# Patient Record
Sex: MALE | Race: ASIAN | NOT HISPANIC OR LATINO | URBAN - METROPOLITAN AREA
[De-identification: names, ages, dates, MRNs, and addresses within clinical notes are randomized per-mention and may not be internally consistent; named-entity substitution may affect disease eponyms.]

---

## 2022-12-07 ENCOUNTER — EMERGENCY (EMERGENCY)
Facility: HOSPITAL | Age: 47
LOS: 1 days | Discharge: HOME | End: 2022-12-07
Attending: EMERGENCY MEDICINE | Admitting: EMERGENCY MEDICINE

## 2022-12-07 VITALS
WEIGHT: 169.98 LBS | OXYGEN SATURATION: 97 % | SYSTOLIC BLOOD PRESSURE: 149 MMHG | DIASTOLIC BLOOD PRESSURE: 90 MMHG | TEMPERATURE: 99 F | RESPIRATION RATE: 20 BRPM | HEART RATE: 74 BPM
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
## 2022-12-07 DIAGNOSIS — Z23 ENCOUNTER FOR IMMUNIZATION: ICD-10-CM
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
## 2022-12-07 DIAGNOSIS — S61.304A UNSPECIFIED OPEN WOUND OF RIGHT RING FINGER WITH DAMAGE TO NAIL, INITIAL ENCOUNTER: ICD-10-CM
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
## 2022-12-07 PROCEDURE — 73130 X-RAY EXAM OF HAND: CPT | Mod: 26

## 2022-12-07 PROCEDURE — 73130 X-RAY EXAM OF HAND: CPT | Mod: 26,50

## 2022-12-07 PROCEDURE — 99284 EMERGENCY DEPT VISIT MOD MDM: CPT | Mod: 25

## 2022-12-07 RX ORDER — LIDOCAINE HCL 20 MG/ML
1 VIAL (ML) INJECTION ONCE
Refills: 0 | Status: COMPLETED | OUTPATIENT
Start: 2022-12-07 | End: 2022-12-07

## 2022-12-07 RX ORDER — IBUPROFEN 200 MG
600 TABLET ORAL ONCE
Refills: 0 | Status: COMPLETED | OUTPATIENT
Start: 2022-12-07 | End: 2022-12-07

## 2022-12-07 RX ORDER — BACITRACIN ZINC 500 UNIT/G
1 OINTMENT IN PACKET (EA) TOPICAL ONCE
Refills: 0 | Status: COMPLETED | OUTPATIENT
Start: 2022-12-07 | End: 2022-12-07

## 2022-12-07 RX ORDER — TETANUS TOXOID, REDUCED DIPHTHERIA TOXOID AND ACELLULAR PERTUSSIS VACCINE, ADSORBED 5; 2.5; 8; 8; 2.5 [IU]/.5ML; [IU]/.5ML; UG/.5ML; UG/.5ML; UG/.5ML
0.5 SUSPENSION INTRAMUSCULAR ONCE
Refills: 0 | Status: COMPLETED | OUTPATIENT
Start: 2022-12-07 | End: 2022-12-07

## 2022-12-07 RX ADMIN — Medication 1 APPLICATION(S): at 22:02

## 2022-12-07 RX ADMIN — Medication 1 MILLILITER(S): at 22:03

## 2022-12-07 RX ADMIN — TETANUS TOXOID, REDUCED DIPHTHERIA TOXOID AND ACELLULAR PERTUSSIS VACCINE, ADSORBED 0.5 MILLILITER(S): 5; 2.5; 8; 8; 2.5 SUSPENSION INTRAMUSCULAR at 22:01

## 2022-12-07 NOTE — ED ADULT TRIAGE NOTE - CHIEF COMPLAINT QUOTE
Pt presents to ed reporting laceration to middle finger right hand from a window, bleeding controlled. unknown tdap.

## 2022-12-07 NOTE — ED PROVIDER NOTE - MUSCULOSKELETAL, MLM
right hand with flap avulsion of skin just distal to dip joint. concern for extensor tendon injury. 4th finger with small avulsion of skin proximal to nailbed. left middle finger with small subungual hematoma. normal rom.

## 2022-12-07 NOTE — ED PROVIDER NOTE - CARE PROVIDER_API CALL
Vinicio Gilbert)  [address] [place] [address]  Phone: [phone]  Fax: [phone]  Follow Up Time:

## 2022-12-07 NOTE — ED PROVIDER NOTE - CLINICAL SUMMARY MEDICAL DECISION MAKING FREE TEXT BOX
finger injury with laceration to right distal 3rd finger. concern for extensor tendon injury. xray neg for fracture. discussed with Dr. Gilbert/ celio, came to ED to eval. finger injury with laceration to right distal 3rd finger. concern for extensor tendon injury. xray neg for fracture. discussed with Dr. Gilbert/ celio, would like to eval in ED. signed out to Dr. Reese/ TERRY Chavez pending consult dispo

## 2022-12-07 NOTE — ED PROVIDER NOTE - PATIENT PORTAL LINK FT
You can access the FollowMyHealth Patient Portal offered by [facility] by registering at the following website: [email or web address]. By joining [email or web address]’s FollowMyHealth portal, you will also be able to view your health information using other applications (apps) compatible with our system.

## 2022-12-07 NOTE — ED PROVIDER NOTE - NSFOLLOWUPINSTRUCTIONS_ED_ALL_ED_FT
Please see Dr. Gilbert for followup.  Call for appointment.  If you have any problems with followup, please call the ED Referral Coordinator at [phone].  Return to the ER if symptoms worsen or other concerns.    Laceration Care, Adult  A laceration is a cut that goes through all layers of the skin. The cut also goes into the tissue that is right under the skin. Some cuts heal on their own. Others need to be closed with stitches (sutures), staples, skin adhesive strips, or wound glue. Taking care of your cut lowers your risk of infection and helps your cut to heal better.    How to take care of your cut  For stitches or staples:     Keep the wound clean and dry.  If you were given a bandage (dressing), you should change it at least one time per day or as told by your doctor. You should also change it if it gets wet or dirty.  Keep the wound completely dry for the first 24 hours or as told by your doctor. After that time, you may take a shower or a bath. However, make sure that the wound is not soaked in water until after the stitches or staples have been removed.  Clean the wound one time each day or as told by your doctor:    Wash the wound with soap and water.  Rinse the wound with water until all of the soap comes off.  Pat the wound dry with a clean towel. Do not rub the wound.    After you clean the wound, put a thin layer of antibiotic ointment on it as told by your doctor. This ointment:    Helps to prevent infection.  Keeps the bandage from sticking to the wound.    Have your stitches or staples removed as told by your doctor.  If your doctor used skin adhesive strips:     Keep the wound clean and dry.  If you were given a bandage, you should change it at least one time per day or as told by your doctor. You should also change it if it gets dirty or wet.  Do not get the skin adhesive strips wet. You can take a shower or a bath, but be careful to keep the wound dry.  If the wound gets wet, pat it dry with a clean towel. Do not rub the wound.  Skin adhesive strips fall off on their own. You can trim the strips as the wound heals. Do not remove any strips that are still stuck to the wound. They will fall off after a while.  If your doctor used wound glue:     Try to keep your wound dry, but you may briefly wet it in the shower or bath. Do not soak the wound in water, such as by swimming.  After you take a shower or a bath, gently pat the wound dry with a clean towel. Do not rub the wound.  Do not do any activities that will make you really sweaty until the skin glue has fallen off on its own.  Do not apply liquid, cream, or ointment medicine to your wound while the skin glue is still on.  If you were given a bandage, you should change it at least one time per day or as told by your doctor. You should also change it if it gets dirty or wet.  If a bandage is placed over the wound, do not let the tape for the bandage touch the skin glue.  Do not pick at the glue. The skin glue usually stays on for 5–10 days. Then, it falls off of the skin.  General Instructions     To help prevent scarring, make sure to cover your wound with sunscreen whenever you are outside after stitches are removed, after adhesive strips are removed, or when wound glue stays in place and the wound is healed. Make sure to wear a sunscreen of at least 30 SPF.  Take over-the-counter and prescription medicines only as told by your doctor.  If you were given antibiotic medicine or ointment, take or apply it as told by your doctor. Do not stop using the antibiotic even if your wound is getting better.  Do not scratch or pick at the wound.  Keep all follow-up visits as told by your doctor. This is important.  Check your wound every day for signs of infection. Watch for:    Redness, swelling, or pain.  Fluid, blood, or pus.    Raise (elevate) the injured area above the level of your heart while you are sitting or lying down, if possible.  Get help if:  You got a tetanus shot and you have any of these problems at the injection site:    Swelling.  Very bad pain.  Redness.  Bleeding.    You have a fever.  A wound that was closed breaks open.  You notice a bad smell coming from your wound or your bandage.  You notice something coming out of the wound, such as wood or glass.  Medicine does not help your pain.  You have more redness, swelling, or pain at the site of your wound.  You have fluid, blood, or pus coming from your wound.  You notice a change in the color of your skin near your wound.  You need to change the bandage often because fluid, blood, or pus is coming from the wound.  You start to have a new rash.  You start to have numbness around the wound.  Get help right away if:  You have very bad swelling around the wound.  Your pain suddenly gets worse and is very bad.  You notice painful lumps near the wound or on skin that is anywhere on your body.  You have a red streak going away from your wound.  The wound is on your hand or foot and you cannot move a finger or toe like you usually can.  The wound is on your hand or foot and you notice that your fingers or toes look pale or bluish.  This information is not intended to replace advice given to you by your health care provider. Make sure you discuss any questions you have with your health care provider.

## 2022-12-07 NOTE — ED PROVIDER NOTE - OBJECTIVE STATEMENT
denies pmh, here with finger injury. Was at work when a window came down and the window pane crushed his fingertips. He has abrasion/ skin avulsion to several fingers. Normal ROM. Last tetanus unknown. denies pmh, here with finger injury. Was at work when a window came down and the window pane crushed his fingertips. He has abrasion/ skin avulsion to several fingers. Normal ROM. Last tetanus unknown. Right handed

## 2022-12-08 RX ORDER — IBUPROFEN 200 MG
1 TABLET ORAL
Qty: 20 | Refills: 0
Start: 2022-12-08 | End: 2022-12-12

## 2022-12-08 RX ORDER — CEPHALEXIN 500 MG
1 CAPSULE ORAL
Qty: 14 | Refills: 0
Start: 2022-12-08 | End: 2022-12-14

## 2022-12-08 NOTE — ED ADULT NURSE NOTE - OBJECTIVE STATEMENT
Presents for laceration and avulsions to R 2nd and 3rd digits s/p window falling onto hand, unknown last tetanus.     On assessment- AOx4, breathing even and unlabored on RA, no apparent distress, VSS in triage, able to speak in clear coherent sentences, steady gait unassisted, neuro intact with no apparent facial asymmetry, PERRLA. Refusing pain meds. Cap refill wnl with blood controlled with tight bandage on fingers.

## 2022-12-08 NOTE — ED ADULT NURSE NOTE - NSIMPLEMENTINTERV_GEN_ALL_ED
Implemented All Universal Safety Interventions:  Lydia to call system. Call bell, personal items and telephone within reach. Instruct patient to call for assistance. Room bathroom lighting operational. Non-slip footwear when patient is off stretcher. Physically safe environment: no spills, clutter or unnecessary equipment. Stretcher in lowest position, wheels locked, appropriate side rails in place.

## 2022-12-13 NOTE — CONSULT NOTE ADULT - ASSESSMENT
46 yo M, presents with extensor tendon injury to right long finger with overlying laceration and joint involvement. 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   Procedure completed in the ER, please see procedure note  Abx  Patient placed in volar blocking splint.   Follow up instructions given to patient.

## 2022-12-13 NOTE — CONSULT NOTE ADULT - SUBJECTIVE AND OBJECTIVE BOX
48 yo male, presents with right middle finger injury, Patient reports that he was at work when a window fell on his right hand. patient sustained a laceration to the distal aspect of the digit and was brought to the emergency room for evaluation.     Reports mild pain. Denies any numbness or tingling.   No prior injury to the right hand.     PMH: none  PSH: none  Meds: none  Allergies: NKDA    INTERPRETATION:  Clinical history reason for exam: Trauma.    3 views/right and left.    Findings/  impression: No acute fracture or dislocation.    P/E:   + neurovascular exam  right long finger with 3 cm laceration along distal phalanx on dorsal side  Extensor tendon noted with laceration across - unable to extend at DIP joint of right long finger  joint exposed with foreign material noted      
Labs/EKG/Imaging Studies/Medications

## 2022-12-13 NOTE — PROCEDURE NOTE - ADDITIONAL PROCEDURE DETAILS
Preoperative Diagnosis:   1. Right long finger 3 cm laceration along distal phalanx  2. right long finger extensor tendon injury  3. Right long finger distal IP joint injury    Postoperative Diagnosis:   Same as above    Procedure:   1. Arthrotomy of right long finger distal IP joint   2. Extensor tendon repair of right long finger  3. Laceration repair of distal phalanx of right long finger 3 cm    Brief Summary:   46 yo M, presents to ER with extensor tendon injury and laceration to right long finger. Patient reports that window fell on finger and sustained open injury. Required immediate intervention to prevent infection and chronic swan neck deformity. Patient agreed and consented to the procedure.     Procedure:   Right hand was prepped and draped in sterile fashion. digital block was completed for anesthesia. The wound site was irrigated and debrided thoroughly. Due to involvement and exposure of the joint, all debris was removed from the distal interphalangeal joint and it was thoroughly washed out and debrided. we then used a 4-0 nylon suture to repair the extensor tendon with horizontal matress sutures. Simple closure of the distal phalanx laceration was then completed with simple sutures. A sterile dressing was then placed and splint as well. The patient tolerated the procedure appropriately. Follow up was then given to the patient.
